# Patient Record
Sex: FEMALE | Race: WHITE | NOT HISPANIC OR LATINO
[De-identification: names, ages, dates, MRNs, and addresses within clinical notes are randomized per-mention and may not be internally consistent; named-entity substitution may affect disease eponyms.]

---

## 2019-01-24 ENCOUNTER — RESULT REVIEW (OUTPATIENT)
Age: 59
End: 2019-01-24

## 2021-01-12 ENCOUNTER — RESULT REVIEW (OUTPATIENT)
Age: 61
End: 2021-01-12

## 2021-01-19 PROBLEM — Z00.00 ENCOUNTER FOR PREVENTIVE HEALTH EXAMINATION: Status: ACTIVE | Noted: 2021-01-19

## 2021-01-20 ENCOUNTER — APPOINTMENT (OUTPATIENT)
Dept: ORTHOPEDIC SURGERY | Facility: CLINIC | Age: 61
End: 2021-01-20
Payer: COMMERCIAL

## 2021-01-20 VITALS — BODY MASS INDEX: 23.54 KG/M2 | HEIGHT: 67 IN | WEIGHT: 150 LBS

## 2021-01-20 DIAGNOSIS — Z80.9 FAMILY HISTORY OF MALIGNANT NEOPLASM, UNSPECIFIED: ICD-10-CM

## 2021-01-20 DIAGNOSIS — Z82.62 FAMILY HISTORY OF OSTEOPOROSIS: ICD-10-CM

## 2021-01-20 DIAGNOSIS — M25.572 PAIN IN LEFT ANKLE AND JOINTS OF LEFT FOOT: ICD-10-CM

## 2021-01-20 DIAGNOSIS — Z78.9 OTHER SPECIFIED HEALTH STATUS: ICD-10-CM

## 2021-01-20 PROCEDURE — 73630 X-RAY EXAM OF FOOT: CPT | Mod: LT

## 2021-01-20 PROCEDURE — 99072 ADDL SUPL MATRL&STAF TM PHE: CPT

## 2021-01-20 PROCEDURE — 99204 OFFICE O/P NEW MOD 45 MIN: CPT

## 2021-01-20 NOTE — PHYSICAL EXAM
[Normal LLE] : Left Lower Extremity: No scars, rashes, lesions, ulcers, skin intact [Normal Touch] : touch [Normal] : Alert and in no acute distress [de-identified] : Left foot shows a higher arch with a soft tissue mass is a plantar fibroma. There is no tenderness to palpate a 1 x 1 cm. Full range of motion neurovascular exam is normal [de-identified] : Left foot x-rays shows a small heel spur and a small dorsal spur in the midfoot

## 2021-01-20 NOTE — DISCUSSION/SUMMARY
[de-identified] : She most likely has a plantar fibroma on her left foot. I recommended doing nothing about it. The consequences of doing something or that it come back with more aggressive size and multiple. She will followup as needed she'll stretch her plantar fascia handout was given

## 2021-01-20 NOTE — HISTORY OF PRESENT ILLNESS
[FreeTextEntry1] : Location: left foot\par Quality: dull\par Duration: 2-3 weeks\par Context:  atraumatic\par Aggravating Factors: WAlking, weightbearing \par Conservative treatment:  REst\par Associated Symptoms: mass/ lump on bottom of foot\par Prior Studies:n.a\par

## 2022-01-18 ENCOUNTER — RESULT REVIEW (OUTPATIENT)
Age: 62
End: 2022-01-18

## 2022-10-15 ENCOUNTER — NON-APPOINTMENT (OUTPATIENT)
Age: 62
End: 2022-10-15

## 2022-10-17 NOTE — PHYSICAL EXAM
[No Supraclavicular Adenopathy] : no supraclavicular adenopathy [No dominant masses] : no dominant masses in right breast  [No dominant masses] : no dominant masses left breast [No Nipple Retraction] : no left nipple retraction [No Nipple Discharge] : no left nipple discharge [No Axillary Lymphadenopathy] : no left axillary lymphadenopathy

## 2022-10-19 ENCOUNTER — APPOINTMENT (OUTPATIENT)
Dept: BREAST CENTER | Facility: CLINIC | Age: 62
End: 2022-10-19

## 2022-10-19 VITALS
SYSTOLIC BLOOD PRESSURE: 145 MMHG | BODY MASS INDEX: 25.76 KG/M2 | DIASTOLIC BLOOD PRESSURE: 86 MMHG | OXYGEN SATURATION: 98 % | WEIGHT: 164.13 LBS | HEIGHT: 67 IN | HEART RATE: 72 BPM

## 2022-10-19 DIAGNOSIS — Z80.0 FAMILY HISTORY OF MALIGNANT NEOPLASM OF DIGESTIVE ORGANS: ICD-10-CM

## 2022-10-19 DIAGNOSIS — Z78.9 OTHER SPECIFIED HEALTH STATUS: ICD-10-CM

## 2022-10-19 PROCEDURE — 99205 OFFICE O/P NEW HI 60 MIN: CPT

## 2022-10-21 ENCOUNTER — NON-APPOINTMENT (OUTPATIENT)
Age: 62
End: 2022-10-21

## 2022-10-24 ENCOUNTER — APPOINTMENT (OUTPATIENT)
Dept: GYNECOLOGIC ONCOLOGY | Facility: CLINIC | Age: 62
End: 2022-10-24
Payer: COMMERCIAL

## 2022-10-24 VITALS
WEIGHT: 165 LBS | SYSTOLIC BLOOD PRESSURE: 122 MMHG | TEMPERATURE: 97.5 F | HEIGHT: 67 IN | BODY MASS INDEX: 25.9 KG/M2 | OXYGEN SATURATION: 98 % | HEART RATE: 84 BPM | DIASTOLIC BLOOD PRESSURE: 73 MMHG

## 2022-10-24 PROCEDURE — 99204 OFFICE O/P NEW MOD 45 MIN: CPT

## 2022-10-24 NOTE — CONSULT LETTER
[Dear  ___] : Dear ~MARIPOSA, [Consult Letter:] : I had the pleasure of evaluating your patient, [unfilled]. [Please see my note below.] : Please see my note below. [Consult Closing:] : Thank you very much for allowing me to participate in the care of this patient.  If you have any questions, please do not hesitate to contact me. [Sincerely,] : Sincerely, [FreeTextEntry2] : Dr. Scott [FreeTextEntry3] : Jameson\par \par Jameson Alston MD\par Chief of Breast Surgery\par Director of Breast Cancer Program\par [facility]\par \par \par

## 2022-10-24 NOTE — PAST MEDICAL HISTORY
[Menarche Age ____] : age at menarche was [unfilled] [Menopause Age____] : age at menopause was [unfilled] [Total Preg ___] : G[unfilled] [Live Births ___] : P[unfilled]  [Age At Live Birth ___] : Age at live birth: [unfilled] [History of Hormone Replacement Treatment] : has no history of hormone replacement treatment [FreeTextEntry2] : 1 miscarriage [FreeTextEntry6] : yes [FreeTextEntry7] : yes [FreeTextEntry8] : yes

## 2022-10-24 NOTE — ASSESSMENT
[FreeTextEntry1] : 61 yo F presents to establish care with history of BRIP1 and MUTYH pathogenic mutations. Patient also had recent MR guided biopsy on 10/4/22 pathology yielding ALH. Patient states she is establishing care with Dr. Bravo in October and she gets regular colonoscopies. Discussed the merits of Tamoxifen to decrease risk. Patient is without complaint, physical exam WNL. We will review radiology films and contact patient if additional recommendations. Patient due for screening MMG/US in Feb 2023 and will RTC in 6 months for reexamination. Patient verbalized understanding and agreement of plan.\par

## 2022-10-24 NOTE — DATA REVIEWED
[FreeTextEntry1] : 12/1/20 B/L sMMG([place]): Heterogeneously dense. No suspicious findings. BIRADS 2. \par \par 2/16/22 B/L MMG/US ([place]): Heterogeneously dense. No suspicious findings. BIRADS 2. \par \par 10/4/22 R MR Guided Biopsy ([place]): R 12:00 ALH amidst otherwise unremarkable breast tissue. Rare calcifications seen. Benign and concordant.

## 2022-10-25 LAB — CANCER AG125 SERPL-ACNC: 5 U/ML

## 2022-10-27 ENCOUNTER — APPOINTMENT (OUTPATIENT)
Dept: HEMATOLOGY ONCOLOGY | Facility: CLINIC | Age: 62
End: 2022-10-27

## 2022-10-27 PROCEDURE — 99204 OFFICE O/P NEW MOD 45 MIN: CPT | Mod: 95

## 2022-11-05 ENCOUNTER — OUTPATIENT (OUTPATIENT)
Dept: OUTPATIENT SERVICES | Facility: HOSPITAL | Age: 62
LOS: 1 days | End: 2022-11-05
Payer: COMMERCIAL

## 2022-11-05 ENCOUNTER — RESULT REVIEW (OUTPATIENT)
Age: 62
End: 2022-11-05

## 2022-11-05 DIAGNOSIS — N63.0 UNSPECIFIED LUMP IN UNSPECIFIED BREAST: ICD-10-CM

## 2022-11-05 PROCEDURE — 88321 CONSLTJ&REPRT SLD PREP ELSWR: CPT

## 2022-11-11 LAB — SURGICAL PATHOLOGY STUDY: SIGNIFICANT CHANGE UP

## 2022-12-28 NOTE — HISTORY OF PRESENT ILLNESS
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 Alston\par \par Previous Therapies:\par 1) Genetics 1/2022: MUTYH and BRIP1 mutation\par \par 63yo w/ BRIP1 and MUTYH pathogenic mutations here to establish care. Pt was tested more recently because it was more affordable now to get tested. Pt feels good. denies bloating, change in appetite or wt, change in bowel or urinary habbits. Pt has "ribbon" defecation which has had a negative workup by GI. \par Of note: Mother breast ca at age 50, MGF had colon cancer\par \par Maintenance:\par PAP: 1/2022 negative\par Colonoscopy: 2019 neg- due in 2014\par Mammo: 10/4/22 R MR Guided Breast Biopsy ([place]): R 12:00 ALH amidst otherwise unremarkable breast tissue. Rare calcifications seen. Benign and concordant. \par Dexa: scheduled

## 2022-12-28 NOTE — DISCUSSION/SUMMARY
[Reviewed Clinical Lab Test(s)] : Results of clinical tests were reviewed. [Reviewed Radiology Report(s)] : Radiology reports were reviewed. [Discuss Alternatives/Risks/Benefits w/Patient] : All alternatives, risks, and benefits were discussed with the patient/family and all questions were answered.  Patient expressed good understanding and appreciates the importance of follow up as recommended. [Pre Op] : The differential diagnosis was discussed in detail. The indications, risks, benefits and alternatives were discussed. [unfilled] expressed an understanding of the treatment rationale and her questions were answered to her apparent satisfaction. [FreeTextEntry2] : BRIP1 mutation [FreeTextEntry1] : I had a long discussion with the patient with the aid of diagrams and reviewed the pertinent findings. BRIP1 mutations have been found in familial breast and ovarian cancer pedigrees.  BRIP1 mutations confer an 11-fold increase of ovarian cancer, but not with an increased risk in breast cancer. Current NCCN guidelines recommend consideration of RRSO at age 45-50. Lifetime risk of ovarian cancer 4-13%\par Screening modalities have been proven ineffective in detecting ovarian cancer at earlier stages. In the absence of better options pelvic exams, transvaginal ultrasounds and CA-125 every 6 months can be considered for patients who decline surgical intervention.\par \par []Mx=360 drawn today\par Continue general gyn care with Dr. Scott\par []CTscan\par []recommend RRSO\par []patient prefers to wait until January as she is doing a major renovation on her townhouse\par

## 2023-01-18 ENCOUNTER — OUTPATIENT (OUTPATIENT)
Dept: OUTPATIENT SERVICES | Facility: HOSPITAL | Age: 63
LOS: 1 days | End: 2023-01-18
Payer: COMMERCIAL

## 2023-01-18 ENCOUNTER — APPOINTMENT (OUTPATIENT)
Dept: CT IMAGING | Facility: HOSPITAL | Age: 63
End: 2023-01-18
Payer: COMMERCIAL

## 2023-01-18 PROCEDURE — 82565 ASSAY OF CREATININE: CPT

## 2023-01-18 PROCEDURE — 74177 CT ABD & PELVIS W/CONTRAST: CPT | Mod: 26

## 2023-01-18 PROCEDURE — 74177 CT ABD & PELVIS W/CONTRAST: CPT

## 2023-01-23 ENCOUNTER — APPOINTMENT (OUTPATIENT)
Dept: GYNECOLOGIC ONCOLOGY | Facility: CLINIC | Age: 63
End: 2023-01-23
Payer: COMMERCIAL

## 2023-01-23 VITALS
SYSTOLIC BLOOD PRESSURE: 133 MMHG | OXYGEN SATURATION: 98 % | HEART RATE: 79 BPM | WEIGHT: 165 LBS | BODY MASS INDEX: 25.9 KG/M2 | HEIGHT: 67 IN | DIASTOLIC BLOOD PRESSURE: 77 MMHG | TEMPERATURE: 97.3 F

## 2023-01-23 PROCEDURE — 99213 OFFICE O/P EST LOW 20 MIN: CPT

## 2023-01-24 NOTE — DISCUSSION/SUMMARY
[Reviewed Clinical Lab Test(s)] : Results of clinical tests were reviewed. [Reviewed Radiology Report(s)] : Radiology reports were reviewed. [Discuss Alternatives/Risks/Benefits w/Patient] : All alternatives, risks, and benefits were discussed with the patient/family and all questions were answered.  Patient expressed good understanding and appreciates the importance of follow up as recommended. [Pre Op] : The differential diagnosis was discussed in detail. The indications, risks, benefits and alternatives were discussed. [unfilled] expressed an understanding of the treatment rationale and her questions were answered to her apparent satisfaction. [FreeTextEntry2] : BRIP1 mutation [FreeTextEntry1] : I had a long discussion with the patient with the aid of diagrams and reviewed the pertinent findings. BRIP1 mutations have been found in familial breast and ovarian cancer pedigrees.  BRIP1 mutations confer an 11-fold increase of ovarian cancer, but not with an increased risk in breast cancer. Current NCCN guidelines recommend consideration of RRSO at age 45-50. Lifetime risk of ovarian cancer 4-13%\par Screening modalities have been proven ineffective in detecting ovarian cancer at earlier stages. In the absence of better options pelvic exams, transvaginal ultrasounds and CA-125 every 6 months can be considered for patients who decline surgical intervention.\par \par [] OR RA-BSO (Risk Reducing)\par [] Medical clearance\par [] Preop covid\par \par \par

## 2023-01-24 NOTE — HISTORY OF PRESENT ILLNESS
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 Alston\par \par Previous Therapies:\par 1) Genetics 1/2022: MUTYH and BRIP1 mutation\par 2) CTAP 1/18/2023 normal \par \par 63yo w/ BRIP1 and MUTYH pathogenic mutations here for presurgical discussion. Pt without complaints. \par \par \par Maintenance:\par PAP: 1/2022 negative\par Colonoscopy: 2019 neg- due in 2014\par Mammo: 10/4/22 R MR Guided Breast Biopsy ([place]): R 12:00 ALH amidst otherwise unremarkable breast tissue. Rare calcifications seen. Benign and concordant. \par Dexa: scheduled

## 2023-02-01 ENCOUNTER — APPOINTMENT (OUTPATIENT)
Dept: MAMMOGRAPHY | Facility: HOSPITAL | Age: 63
End: 2023-02-01

## 2023-02-01 ENCOUNTER — APPOINTMENT (OUTPATIENT)
Dept: ULTRASOUND IMAGING | Facility: HOSPITAL | Age: 63
End: 2023-02-01

## 2023-02-13 ENCOUNTER — LABORATORY RESULT (OUTPATIENT)
Age: 63
End: 2023-02-13

## 2023-02-15 ENCOUNTER — TRANSCRIPTION ENCOUNTER (OUTPATIENT)
Age: 63
End: 2023-02-15

## 2023-02-15 VITALS
DIASTOLIC BLOOD PRESSURE: 84 MMHG | WEIGHT: 156.31 LBS | HEART RATE: 86 BPM | TEMPERATURE: 98 F | OXYGEN SATURATION: 98 % | SYSTOLIC BLOOD PRESSURE: 145 MMHG | RESPIRATION RATE: 16 BRPM | HEIGHT: 67 IN

## 2023-02-15 NOTE — ASU PATIENT PROFILE, ADULT - FALL HARM RISK - UNIVERSAL INTERVENTIONS
Bed in lowest position, wheels locked, appropriate side rails in place/Call bell, personal items and telephone in reach/Instruct patient to call for assistance before getting out of bed or chair/Non-slip footwear when patient is out of bed/[place] to call system/Physically safe environment - no spills, clutter or unnecessary equipment/Purposeful Proactive Rounding/Room/bathroom lighting operational, light cord in reach

## 2023-02-15 NOTE — ASU PATIENT PROFILE, ADULT - NSICDXPASTSURGICALHX_GEN_ALL_CORE_FT
PAST SURGICAL HISTORY:  H/O arthroscopy of knee     History of  delivery     Cedillo's neuroma of both feet

## 2023-02-16 ENCOUNTER — OUTPATIENT (OUTPATIENT)
Dept: INPATIENT UNIT | Facility: HOSPITAL | Age: 63
LOS: 1 days | Discharge: HOME | End: 2023-02-16
Payer: COMMERCIAL

## 2023-02-16 ENCOUNTER — TRANSCRIPTION ENCOUNTER (OUTPATIENT)
Age: 63
End: 2023-02-16

## 2023-02-16 ENCOUNTER — RESULT REVIEW (OUTPATIENT)
Age: 63
End: 2023-02-16

## 2023-02-16 ENCOUNTER — APPOINTMENT (OUTPATIENT)
Dept: GYNECOLOGIC ONCOLOGY | Facility: HOSPITAL | Age: 63
End: 2023-02-16

## 2023-02-16 VITALS
DIASTOLIC BLOOD PRESSURE: 90 MMHG | RESPIRATION RATE: 15 BRPM | OXYGEN SATURATION: 99 % | SYSTOLIC BLOOD PRESSURE: 159 MMHG | HEART RATE: 63 BPM

## 2023-02-16 DIAGNOSIS — Z98.891 HISTORY OF UTERINE SCAR FROM PREVIOUS SURGERY: Chronic | ICD-10-CM

## 2023-02-16 DIAGNOSIS — G57.63 LESION OF PLANTAR NERVE, BILATERAL LOWER LIMBS: Chronic | ICD-10-CM

## 2023-02-16 DIAGNOSIS — Z98.890 OTHER SPECIFIED POSTPROCEDURAL STATES: Chronic | ICD-10-CM

## 2023-02-16 LAB
BLD GP AB SCN SERPL QL: NEGATIVE — SIGNIFICANT CHANGE UP
RH IG SCN BLD-IMP: POSITIVE — SIGNIFICANT CHANGE UP

## 2023-02-16 PROCEDURE — 88112 CYTOPATH CELL ENHANCE TECH: CPT

## 2023-02-16 PROCEDURE — 86850 RBC ANTIBODY SCREEN: CPT

## 2023-02-16 PROCEDURE — 58661 LAPAROSCOPY REMOVE ADNEXA: CPT | Mod: 50

## 2023-02-16 PROCEDURE — 88305 TISSUE EXAM BY PATHOLOGIST: CPT | Mod: 26

## 2023-02-16 PROCEDURE — 88305 TISSUE EXAM BY PATHOLOGIST: CPT | Mod: 26,59

## 2023-02-16 PROCEDURE — 88305 TISSUE EXAM BY PATHOLOGIST: CPT

## 2023-02-16 PROCEDURE — 86901 BLOOD TYPING SEROLOGIC RH(D): CPT

## 2023-02-16 PROCEDURE — 86900 BLOOD TYPING SEROLOGIC ABO: CPT

## 2023-02-16 PROCEDURE — 88112 CYTOPATH CELL ENHANCE TECH: CPT | Mod: 26

## 2023-02-16 PROCEDURE — S2900: CPT

## 2023-02-16 RX ORDER — HEPARIN SODIUM 5000 [USP'U]/ML
5000 INJECTION INTRAVENOUS; SUBCUTANEOUS ONCE
Refills: 0 | Status: COMPLETED | OUTPATIENT
Start: 2023-02-16 | End: 2023-02-16

## 2023-02-16 RX ORDER — HYDROMORPHONE HYDROCHLORIDE 2 MG/ML
0.5 INJECTION INTRAMUSCULAR; INTRAVENOUS; SUBCUTANEOUS
Refills: 0 | Status: DISCONTINUED | OUTPATIENT
Start: 2023-02-16 | End: 2023-02-16

## 2023-02-16 RX ORDER — ACETAMINOPHEN 500 MG
1000 TABLET ORAL ONCE
Refills: 0 | Status: COMPLETED | OUTPATIENT
Start: 2023-02-16 | End: 2023-02-16

## 2023-02-16 RX ORDER — CELECOXIB 200 MG/1
400 CAPSULE ORAL ONCE
Refills: 0 | Status: COMPLETED | OUTPATIENT
Start: 2023-02-16 | End: 2023-02-16

## 2023-02-16 RX ORDER — OXYCODONE HYDROCHLORIDE 5 MG/1
10 TABLET ORAL EVERY 4 HOURS
Refills: 0 | Status: DISCONTINUED | OUTPATIENT
Start: 2023-02-16 | End: 2023-02-16

## 2023-02-16 RX ORDER — SODIUM CHLORIDE 9 MG/ML
1000 INJECTION, SOLUTION INTRAVENOUS
Refills: 0 | Status: DISCONTINUED | OUTPATIENT
Start: 2023-02-16 | End: 2023-02-16

## 2023-02-16 RX ORDER — KETOROLAC TROMETHAMINE 30 MG/ML
30 SYRINGE (ML) INJECTION EVERY 8 HOURS
Refills: 0 | Status: DISCONTINUED | OUTPATIENT
Start: 2023-02-16 | End: 2023-02-16

## 2023-02-16 RX ORDER — ACETAMINOPHEN 500 MG
1000 TABLET ORAL EVERY 6 HOURS
Refills: 0 | Status: DISCONTINUED | OUTPATIENT
Start: 2023-02-16 | End: 2023-02-16

## 2023-02-16 RX ORDER — ONDANSETRON 8 MG/1
8 TABLET, FILM COATED ORAL EVERY 8 HOURS
Refills: 0 | Status: DISCONTINUED | OUTPATIENT
Start: 2023-02-16 | End: 2023-02-16

## 2023-02-16 RX ORDER — SIMETHICONE 80 MG/1
80 TABLET, CHEWABLE ORAL EVERY 6 HOURS
Refills: 0 | Status: DISCONTINUED | OUTPATIENT
Start: 2023-02-16 | End: 2023-02-16

## 2023-02-16 RX ORDER — OXYCODONE HYDROCHLORIDE 5 MG/1
5 TABLET ORAL EVERY 4 HOURS
Refills: 0 | Status: DISCONTINUED | OUTPATIENT
Start: 2023-02-16 | End: 2023-02-16

## 2023-02-16 RX ADMIN — CELECOXIB 400 MILLIGRAM(S): 200 CAPSULE ORAL at 06:36

## 2023-02-16 RX ADMIN — Medication 1000 MILLIGRAM(S): at 06:36

## 2023-02-16 RX ADMIN — CELECOXIB 400 MILLIGRAM(S): 200 CAPSULE ORAL at 06:41

## 2023-02-16 RX ADMIN — HEPARIN SODIUM 5000 UNIT(S): 5000 INJECTION INTRAVENOUS; SUBCUTANEOUS at 06:36

## 2023-02-16 RX ADMIN — Medication 1000 MILLIGRAM(S): at 06:41

## 2023-02-16 NOTE — BRIEF OPERATIVE NOTE - NSICDXBRIEFPREOP_GEN_ALL_CORE_FT
PRE-OP DIAGNOSIS:  High risk of ovarian cancer 16-Feb-2023 09:18:51 BRIP-1 mutation carrier Tiffany García

## 2023-02-16 NOTE — ASU DISCHARGE PLAN (ADULT/PEDIATRIC) - CARE PROVIDER_API CALL
Meggan Bravo (DO)  Gynecologic Oncology; Obstetrics and Gynecology  [address] [place] [address], [address], [address]  [place], [place] [facility]  Phone: [phone]  Fax: [phone]  Follow Up Time:

## 2023-02-16 NOTE — BRIEF OPERATIVE NOTE - OPERATION/FINDINGS
Normal uterus, ovaries and tubes. Normal pelvic exam. Abdominal entrance with veress. Three 8 mm ports placed. Uterus densely adherent to anterior abdominal wall. Omental adhesions to anterior abdominal wall. Pelvic washings taken. Omental adhesion taken down. B/l BSO performed robotically. Ovaries removed in endocatch bags. Umbilical fascia closed with vicryl on UR6. All skin ports closed with monocryl and dermabond Normal pelvic exam. Vaginal prep with chlorhexidine. Abdominal entrance with veress. Three 8 mm ports placed. Uterus densely adherent to anterior abdominal wall. Omental adhesions to anterior abdominal wall. Pelvic washings taken. Omental adhesion taken down. B/l BSO performed robotically. Ovaries removed in endocatch bags. Umbilical fascia closed with vicryl on UR6. All skin ports closed with monocryl and dermabond

## 2023-02-16 NOTE — ASU DISCHARGE PLAN (ADULT/PEDIATRIC) - NS MD DC FALL RISK RISK
For information on Fall & Injury Prevention, visit: [email or web address] OR  [email or web address] OR  https://www.cdc.gov/steadi/patient.html

## 2023-02-17 LAB — NON-GYNECOLOGICAL CYTOLOGY STUDY: SIGNIFICANT CHANGE UP

## 2023-02-22 PROBLEM — E78.5 HYPERLIPIDEMIA, UNSPECIFIED: Chronic | Status: ACTIVE | Noted: 2023-02-15

## 2023-02-22 PROBLEM — Z15.01 GENETIC SUSCEPTIBILITY TO MALIGNANT NEOPLASM OF BREAST: Chronic | Status: ACTIVE | Noted: 2023-02-15

## 2023-02-23 LAB — SURGICAL PATHOLOGY STUDY: SIGNIFICANT CHANGE UP

## 2023-02-24 ENCOUNTER — APPOINTMENT (OUTPATIENT)
Dept: GYNECOLOGIC ONCOLOGY | Facility: CLINIC | Age: 63
End: 2023-02-24

## 2023-02-24 ENCOUNTER — APPOINTMENT (OUTPATIENT)
Dept: GYNECOLOGIC ONCOLOGY | Facility: CLINIC | Age: 63
End: 2023-02-24
Payer: COMMERCIAL

## 2023-02-24 VITALS
BODY MASS INDEX: 24.01 KG/M2 | HEART RATE: 93 BPM | SYSTOLIC BLOOD PRESSURE: 124 MMHG | RESPIRATION RATE: 18 BRPM | DIASTOLIC BLOOD PRESSURE: 81 MMHG | HEIGHT: 67 IN | WEIGHT: 153 LBS | OXYGEN SATURATION: 98 % | TEMPERATURE: 96.9 F

## 2023-02-24 PROCEDURE — 99024 POSTOP FOLLOW-UP VISIT: CPT

## 2023-02-24 NOTE — PHYSICAL EXAM
[Normal] : No focal neurologic defects observed [de-identified] : incisions c/d/i, NT/ND [Fully active, able to carry on all pre-disease performance without restriction] : Status 0 - Fully active, able to carry on all pre-disease performance without restriction

## 2023-02-24 NOTE — HISTORY OF PRESENT ILLNESS
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 Alston\par \par Previous Therapies:\par 1) Genetics 1/2022: MUTYH and BRIP1 mutation\par 2) CTAP 1/18/2023 normal \par 3) S/P RA-BSO 2/16/23\par       a) Pathology:\par 1. Right fallopian tube and ovary, salpingo-oophorectomy:\par - Ovary with epithelial inclustion glands\par - Unremarkable fallopian tube\par \par 2. Left  fallopian tube and ovary, salpingo-oophorectomy:\par - Ovary with epithelial inclustion glands\par - Unremarkable fallopian tube\par \par 61yo S/P RA-BSO here for 1 week post-op visit. Pt feeling well. +BMs. pain well controlled. Pathology discussed. \par \par \par Maintenance:\par PAP: 1/2022 negative\par Colonoscopy: 2019 neg- due in 2014\par Mammo: 10/4/22 R MR Guided Breast Biopsy ([place]): R 12:00 ALH amidst otherwise unremarkable breast tissue. Rare calcifications seen. Benign and concordant. \par Dexa: scheduled

## 2023-02-24 NOTE — DISCUSSION/SUMMARY
[FreeTextEntry1] :  S/P RA-BSO\par Doing well post-operatively\par Benign pathology\par \par [] post-op precautions given\par [] f/u with [place] in 3 weeks\par  Complex Repair And Double M Plasty Text: The defect edges were debeveled with a #15 scalpel blade.  The primary defect was closed partially with a complex linear closure.  Given the location of the remaining defect, shape of the defect and the proximity to free margins a double M plasty was deemed most appropriate for complete closure of the defect.  Using a sterile surgical marker, an appropriate advancement flap was drawn incorporating the defect and placing the expected incisions within the relaxed skin tension lines where possible.    The area thus outlined was incised deep to adipose tissue with a #15 scalpel blade.  The skin margins were undermined to an appropriate distance in all directions utilizing iris scissors.

## 2023-03-15 ENCOUNTER — APPOINTMENT (OUTPATIENT)
Dept: GYNECOLOGIC ONCOLOGY | Facility: CLINIC | Age: 63
End: 2023-03-15
Payer: COMMERCIAL

## 2023-03-15 VITALS
OXYGEN SATURATION: 95 % | SYSTOLIC BLOOD PRESSURE: 150 MMHG | BODY MASS INDEX: 24.01 KG/M2 | HEIGHT: 67 IN | TEMPERATURE: 97.1 F | DIASTOLIC BLOOD PRESSURE: 91 MMHG | HEART RATE: 91 BPM | WEIGHT: 153 LBS

## 2023-03-15 PROCEDURE — 99212 OFFICE O/P EST SF 10 MIN: CPT

## 2023-03-16 NOTE — PHYSICAL EXAM
[Fully active, able to carry on all pre-disease performance without restriction] : Status 0 - Fully active, able to carry on all pre-disease performance without restriction [Normal] : Uterus: Normal size, no tenderness, no masses [Absent] : Adnexa(ae): Absent [Chaperone Present] : A chaperone was present in the examining room during all aspects of the physical examination [de-identified] : incisions c/d/i, NT/ND

## 2023-03-16 NOTE — HISTORY OF PRESENT ILLNESS
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 Alston\par \par Previous Therapies:\par 1) Genetics 1/2022: MUTYH and BRIP1 mutation\par 2) CTAP 1/18/2023 normal \par 3) S/P RA-BSO 2/16/23\par       a) Pathology:\par 1. Right fallopian tube and ovary, salpingo-oophorectomy:\par - Ovary with epithelial inclustion glands\par - Unremarkable fallopian tube\par \par 2. Left  fallopian tube and ovary, salpingo-oophorectomy:\par - Ovary with epithelial inclustion glands\par - Unremarkable fallopian tube\par \par 61yo S/P RA-BSO here for 1 Month post-op visit. Pt feeling well. No complaints.  Had no pain, just mild bloating after surgery.  Did not have any hotflashes\par \par \par Maintenance:\par PAP: 1/2022 negative\par Colonoscopy: 2019 neg- due in 2014\par Mammo: 10/4/22 R MR Guided Breast Biopsy ([place]): R 12:00 ALH amidst otherwise unremarkable breast tissue. Rare calcifications seen. Benign and concordant. \par Dexa: had one in January 2023.  doesn't have report.

## 2023-03-16 NOTE — DISCUSSION/SUMMARY
[Reviewed Clinical Lab Test(s)] : Results of clinical tests were reviewed. [Reviewed Radiology Report(s)] : Radiology reports were reviewed. [Discuss Alternatives/Risks/Benefits w/Patient] : All alternatives, risks, and benefits were discussed with the patient/family and all questions were answered.  Patient expressed good understanding and appreciates the importance of follow up as recommended. [FreeTextEntry1] : S/P RA-BSO for BRIP1 mutation \par Doing well post-operatively\par Benign pathology.  Needs breast evaluation for right breast\par \par [] f/u in 6 months\par [] Continue general gyn care with Dr. Scott\par [] reached out to Dr. Alston to get her seen\par \par \par Patient being seen today for follow-up. CIC 95-27 Registry discussed with patient in detail and all questions answered by myself. After discussion, patient agreed to participate in the the study titled "FOCR: Familial Ovarian Cancer Registry: Family and Medical History and Biosample Resource Program" witnessed by Jalil Clark. A copy of the consent given to the patient. Questionnaires given to the patient to complete. Patient understands that she will be contacted at a later date for blood draw. She is willing and able to comply with this requirement of the study. Will follow up with patient.\par  \par Qualifying History:\par Patient personal history of BRIP1 mutation\par Maternal breast cancer\par Maternal grandfather colon cancer

## 2023-04-19 ENCOUNTER — APPOINTMENT (OUTPATIENT)
Dept: BREAST CENTER | Facility: CLINIC | Age: 63
End: 2023-04-19
Payer: COMMERCIAL

## 2023-05-17 ENCOUNTER — APPOINTMENT (OUTPATIENT)
Dept: BREAST CENTER | Facility: CLINIC | Age: 63
End: 2023-05-17
Payer: COMMERCIAL

## 2023-05-17 VITALS
BODY MASS INDEX: 22.29 KG/M2 | WEIGHT: 142 LBS | HEIGHT: 67 IN | HEART RATE: 79 BPM | OXYGEN SATURATION: 97 % | SYSTOLIC BLOOD PRESSURE: 119 MMHG | DIASTOLIC BLOOD PRESSURE: 83 MMHG

## 2023-05-17 PROCEDURE — 99213 OFFICE O/P EST LOW 20 MIN: CPT

## 2023-05-19 NOTE — DATA REVIEWED
[FreeTextEntry1] : 12/1/20 B/L sMMG([place]): Heterogeneously dense. No suspicious findings. BIRADS 2. \par \par 2/16/22 B/L MMG/US ([place]): Heterogeneously dense. No suspicious findings. BIRADS 2. \par \par 10/4/22 R MR Guided Biopsy ([place]): R 12:00 ALH amidst otherwise unremarkable breast tissue. Rare calcifications seen. Benign and concordant. \par \par 3/2/23 B/L sMMG/US ([place]): Heterogeneously dense. Right breast upper outer quadrant biopsy marker clip with adjacent post-biopsy distortion. No mammographic or US evidence of malignancy. Follow up with annual screening. Surgical consult remains recommended for right breast high-risk lesion. BIRADS 2.

## 2023-05-19 NOTE — ASSESSMENT
[FreeTextEntry1] : 62 yo F presents for high risk follow up with history of BRIP1 and MUTYH pathogenic mutations. Patient also had recent MR guided biopsy on 10/4/22 pathology yielding ALH. Patient is followed by Dr. Bravo in October and she gets regular colonoscopies. \par \par Discussed with the patient the implications for her lifetime risk, which is considered to be at high risk to develop breast cancer over the span of her lifetime and it is recommended that she undergoes high risk screening surveillance to include biannual radiological screening exams with a mammogram and screening MRI. Reviewed prevention options from lifestyle, radiological surveillance and anti-estrogen maintenance. \par \par Patient is without complaint, physical exam WNL. Most recent imaging reviewed: B/L sMMG/US 3/2/23 BIRADS-2. Plan for high risk B/L MRI and reexamination in Oct 2023. Will also refer to med onc for discussion of chemoprevention. Patient verbalized understanding and agreement of plan.  \par

## 2023-05-19 NOTE — HISTORY OF PRESENT ILLNESS
[FreeTextEntry1] : 62 yo F presents for high risk follow up with history of BRIP1 and MUTYH pathogenic mutations. Patient also had recent MR guided biopsy on 10/4/22 pathology yielding ALH. Patient states she had needle biopsy of calcium deposit in 2009. Patient has established care with Dr. Bravo in October and she gets regular colonoscopies. Denies any breast surgeries. Denies any palpable abnormalities, skin changes, nipple changes, or nipple discharge bilaterally. B/L sMMG/US 3/2/23 BIRADS 2. \par \par Fam Hx: \par Breast Cancer Mother @ 50\par \par MISHA 22.7%

## 2023-05-19 NOTE — PAST MEDICAL HISTORY
[History of Hormone Replacement Treatment] : has no history of hormone replacement treatment [FreeTextEntry2] : 1 miscarriage [FreeTextEntry6] : yes [FreeTextEntry7] : yes [FreeTextEntry8] : yes

## 2023-05-22 ENCOUNTER — APPOINTMENT (OUTPATIENT)
Dept: HEMATOLOGY ONCOLOGY | Facility: CLINIC | Age: 63
End: 2023-05-22
Payer: COMMERCIAL

## 2023-05-22 VITALS
WEIGHT: 146 LBS | TEMPERATURE: 98.3 F | RESPIRATION RATE: 18 BRPM | SYSTOLIC BLOOD PRESSURE: 125 MMHG | HEART RATE: 92 BPM | HEIGHT: 67 IN | OXYGEN SATURATION: 97 % | DIASTOLIC BLOOD PRESSURE: 80 MMHG | BODY MASS INDEX: 22.91 KG/M2

## 2023-05-22 PROCEDURE — 99204 OFFICE O/P NEW MOD 45 MIN: CPT

## 2023-05-22 RX ORDER — CIPROFLOXACIN HYDROCHLORIDE 500 MG/1
500 TABLET, FILM COATED ORAL
Qty: 6 | Refills: 0 | Status: DISCONTINUED | COMMUNITY
Start: 2022-06-21 | End: 2023-05-22

## 2023-05-22 RX ORDER — DOCUSATE SODIUM 100 MG/1
100 CAPSULE, LIQUID FILLED ORAL TWICE DAILY
Qty: 30 | Refills: 0 | Status: DISCONTINUED | COMMUNITY
Start: 2023-02-15 | End: 2023-05-22

## 2023-05-22 RX ORDER — IBUPROFEN 800 MG/1
800 TABLET, FILM COATED ORAL EVERY 6 HOURS
Qty: 40 | Refills: 0 | Status: DISCONTINUED | COMMUNITY
Start: 2023-02-15 | End: 2023-05-22

## 2023-05-22 RX ORDER — TAMOXIFEN CITRATE 20 MG/1
20 TABLET, FILM COATED ORAL DAILY
Qty: 90 | Refills: 1 | Status: ACTIVE | COMMUNITY
Start: 2023-05-22 | End: 1900-01-01

## 2023-05-22 RX ORDER — OXYCODONE AND ACETAMINOPHEN 5; 325 MG/1; MG/1
5-325 TABLET ORAL
Qty: 10 | Refills: 0 | Status: DISCONTINUED | COMMUNITY
Start: 2023-02-15 | End: 2023-05-22

## 2023-05-22 RX ORDER — ATOVAQUONE AND PROGUANIL HYDROCHLORIDE 250; 100 MG/1; MG/1
250-100 TABLET, FILM COATED ORAL
Qty: 31 | Refills: 0 | Status: DISCONTINUED | COMMUNITY
Start: 2022-06-23 | End: 2023-05-22

## 2023-05-22 NOTE — HISTORY OF PRESENT ILLNESS
[de-identified] : 63 year old female with a h/o atypical lobular hyperplasia and BRIP1 and MUTYH pathogenic mutations is referred by Dr. Alston to discuss chemoprevention. MISHA 22.7%.\par \par FHx: mother diagnosed with breast cancer at age 50.\par \par 12/1/20 B/L sMMG([place]): Heterogeneously dense. No suspicious findings. BIRADS 2. \par \par 2/16/22 B/L MMG/US ([place]): Heterogeneously dense. No suspicious findings. BIRADS 2. \par \par 10/4/22 R MR Guided Biopsy ([place]): R 12:00 ALH amidst otherwise unremarkable breast tissue. Rare calcifications seen. Benign and concordant. \par \par 3/2/23 B/L sMMG/US ([place]): Heterogeneously dense. Right breast upper outer quadrant biopsy marker clip with adjacent post-biopsy distortion. No mammographic or US evidence of malignancy. BIRADS 2.  [de-identified] : BRIP1 and MUTYH pathogenic mutations

## 2023-10-25 ENCOUNTER — APPOINTMENT (OUTPATIENT)
Dept: BREAST CENTER | Facility: CLINIC | Age: 63
End: 2023-10-25

## 2023-11-22 ENCOUNTER — NON-APPOINTMENT (OUTPATIENT)
Age: 63
End: 2023-11-22

## 2023-11-27 PROBLEM — Z15.01 MUTATION IN BRIP1 GENE: Status: ACTIVE | Noted: 2022-10-19

## 2023-11-27 PROBLEM — N60.91 ATYPICAL LOBULAR HYPERPLASIA (ALH) OF RIGHT BREAST: Status: ACTIVE | Noted: 2023-05-17

## 2023-11-27 PROBLEM — Z91.89 AT HIGH RISK FOR BREAST CANCER: Status: ACTIVE | Noted: 2022-10-19

## 2023-11-29 ENCOUNTER — NON-APPOINTMENT (OUTPATIENT)
Age: 63
End: 2023-11-29

## 2023-11-29 ENCOUNTER — APPOINTMENT (OUTPATIENT)
Dept: BREAST CENTER | Facility: CLINIC | Age: 63
End: 2023-11-29

## 2023-11-29 DIAGNOSIS — Z91.89 OTHER SPECIFIED PERSONAL RISK FACTORS, NOT ELSEWHERE CLASSIFIED: ICD-10-CM

## 2023-11-29 DIAGNOSIS — Z15.89 GENETIC SUSCEPTIBILITY TO MALIGNANT NEOPLASM OF BREAST: ICD-10-CM

## 2023-11-29 DIAGNOSIS — Z15.01 GENETIC SUSCEPTIBILITY TO MALIGNANT NEOPLASM OF BREAST: ICD-10-CM

## 2023-11-29 DIAGNOSIS — N60.91 UNSPECIFIED BENIGN MAMMARY DYSPLASIA OF RIGHT BREAST: ICD-10-CM

## 2023-12-19 ENCOUNTER — APPOINTMENT (OUTPATIENT)
Dept: HEMATOLOGY ONCOLOGY | Facility: CLINIC | Age: 63
End: 2023-12-19

## 2023-12-21 DIAGNOSIS — R92.30 DENSE BREASTS, UNSPECIFIED: ICD-10-CM

## 2024-01-19 NOTE — HISTORY OF PRESENT ILLNESS
[de-identified] : 63 year old female with a h/o atypical lobular hyperplasia and BRIP1 and MUTYH pathogenic mutations is referred by Dr. Alston to discuss chemoprevention. MISHA 22.7%.Here for f/u.  FHx: mother diagnosed with breast cancer at age 50.  12/1/20 B/L sMMG([place]): Heterogeneously dense. No suspicious findings. BIRADS 2.   2/16/22 B/L MMG/US ([place]): Heterogeneously dense. No suspicious findings. BIRADS 2.   10/4/22 R MR Guided Biopsy ([place]): R 12:00 ALH amidst otherwise unremarkable breast tissue. Rare calcifications seen. Benign and concordant.   3/2/23 B/L sMMG/US ([place]): Heterogeneously dense. Right breast upper outer quadrant biopsy marker clip with adjacent post-biopsy distortion. No mammographic or US evidence of malignancy. BIRADS 2.   8/18/23 MR breast: No MRI evidence of malignancy in either breast. [de-identified] : BRIP1 and MUTYH pathogenic mutations

## 2024-01-22 ENCOUNTER — APPOINTMENT (OUTPATIENT)
Dept: HEMATOLOGY ONCOLOGY | Facility: CLINIC | Age: 64
End: 2024-01-22

## 2024-02-07 ENCOUNTER — APPOINTMENT (OUTPATIENT)
Dept: BREAST CENTER | Facility: CLINIC | Age: 64
End: 2024-02-07

## 2024-04-22 ENCOUNTER — NON-APPOINTMENT (OUTPATIENT)
Age: 64
End: 2024-04-22

## 2024-04-24 ENCOUNTER — APPOINTMENT (OUTPATIENT)
Dept: BREAST CENTER | Facility: CLINIC | Age: 64
End: 2024-04-24

## 2024-07-10 ENCOUNTER — APPOINTMENT (OUTPATIENT)
Dept: BREAST CENTER | Facility: CLINIC | Age: 64
End: 2024-07-10
Payer: SELF-PAY

## 2024-07-10 VITALS
HEIGHT: 67 IN | WEIGHT: 148 LBS | BODY MASS INDEX: 23.23 KG/M2 | HEART RATE: 93 BPM | SYSTOLIC BLOOD PRESSURE: 129 MMHG | DIASTOLIC BLOOD PRESSURE: 82 MMHG

## 2024-07-10 DIAGNOSIS — N60.91 UNSPECIFIED BENIGN MAMMARY DYSPLASIA OF RIGHT BREAST: ICD-10-CM

## 2024-07-10 DIAGNOSIS — Z91.89 OTHER SPECIFIED PERSONAL RISK FACTORS, NOT ELSEWHERE CLASSIFIED: ICD-10-CM

## 2024-07-10 DIAGNOSIS — Z15.89 GENETIC SUSCEPTIBILITY TO MALIGNANT NEOPLASM OF BREAST: ICD-10-CM

## 2024-07-10 DIAGNOSIS — Z15.01 GENETIC SUSCEPTIBILITY TO MALIGNANT NEOPLASM OF BREAST: ICD-10-CM

## 2024-07-10 DIAGNOSIS — Z80.9 FAMILY HISTORY OF MALIGNANT NEOPLASM, UNSPECIFIED: ICD-10-CM

## 2024-07-10 PROCEDURE — 99213 OFFICE O/P EST LOW 20 MIN: CPT

## 2024-10-16 ENCOUNTER — NON-APPOINTMENT (OUTPATIENT)
Age: 64
End: 2024-10-16

## 2024-11-11 NOTE — HISTORY OF PRESENT ILLNESS
Internal Medicine
[FreeTextEntry1] : 63 yo F referred by Dr. Scott presents to establish care with history of BRIP1 and MUTYH pathogenic mutations. Patient also had recent MR guided biopsy on 10/4/22 pathology yielding ALH. Patient states she had needle biopsy of calcium deposit in 2009. Patient states she is establishing care with Dr. Bravo in October and she gets regular colonoscopies. Denies any breast surgeries. Denies any palpable abnormalities, skin changes, nipple changes, or nipple discharge bilaterally.\par \par Fam Hx: \par Breast Cancer Mother @ 50\par \par MISHA 22.7%

## 2025-02-12 ENCOUNTER — NON-APPOINTMENT (OUTPATIENT)
Age: 65
End: 2025-02-12

## 2025-04-09 ENCOUNTER — NON-APPOINTMENT (OUTPATIENT)
Age: 65
End: 2025-04-09

## 2025-04-09 ENCOUNTER — APPOINTMENT (OUTPATIENT)
Dept: BREAST CENTER | Facility: CLINIC | Age: 65
End: 2025-04-09

## 2025-04-09 DIAGNOSIS — R92.30 DENSE BREASTS, UNSPECIFIED: ICD-10-CM

## 2025-04-09 DIAGNOSIS — Z15.89 GENETIC SUSCEPTIBILITY TO MALIGNANT NEOPLASM OF BREAST: ICD-10-CM

## 2025-04-09 DIAGNOSIS — Z91.89 OTHER SPECIFIED PERSONAL RISK FACTORS, NOT ELSEWHERE CLASSIFIED: ICD-10-CM

## 2025-04-09 DIAGNOSIS — N60.91 UNSPECIFIED BENIGN MAMMARY DYSPLASIA OF RIGHT BREAST: ICD-10-CM

## 2025-04-09 DIAGNOSIS — Z15.01 GENETIC SUSCEPTIBILITY TO MALIGNANT NEOPLASM OF BREAST: ICD-10-CM

## 2025-04-09 PROCEDURE — 99213 OFFICE O/P EST LOW 20 MIN: CPT

## 2025-09-12 ENCOUNTER — NON-APPOINTMENT (OUTPATIENT)
Age: 65
End: 2025-09-12

## (undated) DEVICE — PACK PERI GYN

## (undated) DEVICE — Device

## (undated) DEVICE — SUT MONOCRYL 4-0 27" PS-2 UNDYED

## (undated) DEVICE — XI DRAPE ARM

## (undated) DEVICE — TIP METZENBAUM SCISSOR MONOPOLAR ENDOCUT (ORANGE)

## (undated) DEVICE — XI DRAPE COLUMN

## (undated) DEVICE — FOLEY TRAY 16FR 5CC LF UMETER CLOSED

## (undated) DEVICE — TUBING STRYKER PNEUMOCLEAR SMOKE EVACUATION HIGH FLOW

## (undated) DEVICE — DRSG DERMABOND 0.7ML

## (undated) DEVICE — WARMING BLANKET UPPER ADULT

## (undated) DEVICE — SUT VICRYL 0 27" UR-6

## (undated) DEVICE — TROCAR APPLIED MEDICAL KII FIOS FIRST ENTRY 5MM X 100MM ADVANCED FIXATION

## (undated) DEVICE — XI ARM FORCEP FENESTRATED BIPOLAR 8MM

## (undated) DEVICE — PACK GYN WDC

## (undated) DEVICE — SUT VLOC 180 0 12" GS-21 GREEN

## (undated) DEVICE — INZII RETRIEVAL SYSTEM 5MM

## (undated) DEVICE — XI SEAL UNIV 5- 8 MM

## (undated) DEVICE — XI TIP COVER

## (undated) DEVICE — POSITIONER PINK PAD PIGAZZI SYSTEM XL W ARM PROTECTOR

## (undated) DEVICE — XI ARM FORCEP PROGRASP 8MM

## (undated) DEVICE — XI ARM SCISSOR MONO CURVED

## (undated) DEVICE — D HELP - CLEARVIEW CLEARIFY SYSTEM

## (undated) DEVICE — TROCAR COVIDIEN VERSAONE BLUNT TIP HASSAN 12MM

## (undated) DEVICE — VENODYNE/SCD SLEEVE CALF MEDIUM

## (undated) DEVICE — INSUFFLATION NDL COVIDIEN SURGINEEDLE VERESS 120MM

## (undated) DEVICE — PREP CHLORAPREP HI-LITE ORANGE 26ML

## (undated) DEVICE — POSITIONER FOAM EGG CRATE ULNAR 2PCS (PINK)

## (undated) DEVICE — XI OBTURATOR OPTICAL BLADELESS 8MM

## (undated) DEVICE — GLV 7 PROTEXIS (WHITE)

## (undated) DEVICE — PACK GYN LAPAROSCOPY